# Patient Record
Sex: FEMALE | Race: WHITE | Employment: OTHER | URBAN - NONMETROPOLITAN AREA
[De-identification: names, ages, dates, MRNs, and addresses within clinical notes are randomized per-mention and may not be internally consistent; named-entity substitution may affect disease eponyms.]

---

## 2017-04-22 ENCOUNTER — NURSE TRIAGE (OUTPATIENT)
Dept: ADMINISTRATIVE | Age: 82
End: 2017-04-22

## 2018-07-02 ENCOUNTER — OFFICE VISIT (OUTPATIENT)
Dept: ENT CLINIC | Age: 83
End: 2018-07-02
Payer: MEDICARE

## 2018-07-02 VITALS
RESPIRATION RATE: 20 BRPM | TEMPERATURE: 98.2 F | SYSTOLIC BLOOD PRESSURE: 120 MMHG | DIASTOLIC BLOOD PRESSURE: 80 MMHG | WEIGHT: 169.3 LBS | HEART RATE: 84 BPM | BODY MASS INDEX: 28.21 KG/M2 | HEIGHT: 65 IN

## 2018-07-02 DIAGNOSIS — H61.891 IRRITATION OF EXTERNAL AUDITORY CANAL, RIGHT: Primary | ICD-10-CM

## 2018-07-02 DIAGNOSIS — H91.91 DECREASED HEARING, RIGHT: ICD-10-CM

## 2018-07-02 DIAGNOSIS — Z97.4 WEARS HEARING AID: ICD-10-CM

## 2018-07-02 PROCEDURE — G8427 DOCREV CUR MEDS BY ELIG CLIN: HCPCS | Performed by: NURSE PRACTITIONER

## 2018-07-02 PROCEDURE — 99203 OFFICE O/P NEW LOW 30 MIN: CPT | Performed by: NURSE PRACTITIONER

## 2018-07-02 PROCEDURE — 1090F PRES/ABSN URINE INCON ASSESS: CPT | Performed by: NURSE PRACTITIONER

## 2018-07-02 PROCEDURE — 4040F PNEUMOC VAC/ADMIN/RCVD: CPT | Performed by: NURSE PRACTITIONER

## 2018-07-02 PROCEDURE — 1123F ACP DISCUSS/DSCN MKR DOCD: CPT | Performed by: NURSE PRACTITIONER

## 2018-07-02 PROCEDURE — 1036F TOBACCO NON-USER: CPT | Performed by: NURSE PRACTITIONER

## 2018-07-02 PROCEDURE — G8419 CALC BMI OUT NRM PARAM NOF/U: HCPCS | Performed by: NURSE PRACTITIONER

## 2018-07-02 RX ORDER — OFLOXACIN 3 MG/ML
SOLUTION/ DROPS OPHTHALMIC
Qty: 1 BOTTLE | Refills: 0 | Status: SHIPPED | OUTPATIENT
Start: 2018-07-02

## 2018-07-02 ASSESSMENT — ENCOUNTER SYMPTOMS
SHORTNESS OF BREATH: 0
VOMITING: 0
WHEEZING: 0
ANAL BLEEDING: 0
EYE DISCHARGE: 0
NAUSEA: 0
APNEA: 0
CONSTIPATION: 0
BACK PAIN: 0
RHINORRHEA: 0
SORE THROAT: 0
COLOR CHANGE: 0
TROUBLE SWALLOWING: 0
STRIDOR: 0
COUGH: 0
SINUS PRESSURE: 0
DIARRHEA: 0
CHEST TIGHTNESS: 0
ABDOMINAL DISTENTION: 0
EYE ITCHING: 0
FACIAL SWELLING: 0
VOICE CHANGE: 0
BLOOD IN STOOL: 0
CHOKING: 0
EYE REDNESS: 0
ABDOMINAL PAIN: 0
RECTAL PAIN: 0
EYE PAIN: 0
PHOTOPHOBIA: 0

## 2018-07-02 NOTE — PROGRESS NOTES
[facility]  [address] [facility]  [address] [address]  Dept: [phone]  Dept Fax: [phone]  Loc: [phone]    Dasha Calvo is a 80 y.o. female who was referred by Flavia Valdes PA-C for:  Chief Complaint   Patient presents with    Other     right ear discomfort, cerumen impaction and hearing loss. Padma Derikelliot HPI:       Patient here for right cerumen impaction, ear discomfort and decreased hearing. This has been going on for over a month. The ear has been flushed and she has used drops at home without success. She went to her hearing aid dealer who recommend she be seen by ENT to have cerumen removed. She does wear hearing aids. I do not have a hearing test available. No ear drainage. No nasal symptoms. Subjective:        Review of Systems   Constitutional: Negative for activity change, appetite change, chills, diaphoresis, fatigue, fever and unexpected weight change. HENT: Negative for congestion, dental problem, drooling, ear discharge, ear pain, facial swelling, hearing loss, mouth sores, nosebleeds, postnasal drip, rhinorrhea, sinus pressure, sneezing, sore throat, tinnitus, trouble swallowing and voice change. Eyes: Negative for photophobia, pain, discharge, redness, itching and visual disturbance. Respiratory: Negative for apnea, cough, choking, chest tightness, shortness of breath, wheezing and stridor. Cardiovascular: Negative for chest pain, palpitations and leg swelling. Gastrointestinal: Negative for abdominal distention, abdominal pain, anal bleeding, blood in stool, constipation, diarrhea, nausea, rectal pain and vomiting. Endocrine: Negative for cold intolerance, heat intolerance, polydipsia, polyphagia and polyuria.    Genitourinary: Negative for decreased urine volume, difficulty urinating, dyspareunia, dysuria, enuresis, flank pain, frequency, genital sores, hematuria, menstrual problem, pelvic pain, urgency,

## 2020-07-16 ENCOUNTER — APPOINTMENT (OUTPATIENT)
Dept: GENERAL RADIOLOGY | Age: 85
End: 2020-07-16
Payer: MEDICARE

## 2020-07-16 ENCOUNTER — HOSPITAL ENCOUNTER (EMERGENCY)
Age: 85
Discharge: HOME | End: 2020-07-16
Payer: MEDICARE

## 2020-07-16 ENCOUNTER — APPOINTMENT (OUTPATIENT)
Dept: CT IMAGING | Age: 85
End: 2020-07-16
Payer: MEDICARE

## 2020-07-16 ENCOUNTER — APPOINTMENT (OUTPATIENT)
Dept: INTERVENTIONAL RADIOLOGY/VASCULAR | Age: 85
End: 2020-07-16
Payer: MEDICARE

## 2020-07-16 VITALS
DIASTOLIC BLOOD PRESSURE: 61 MMHG | TEMPERATURE: 98 F | HEIGHT: 60 IN | HEART RATE: 60 BPM | OXYGEN SATURATION: 95 % | BODY MASS INDEX: 28.47 KG/M2 | SYSTOLIC BLOOD PRESSURE: 105 MMHG | RESPIRATION RATE: 18 BRPM | WEIGHT: 145 LBS

## 2020-07-16 LAB
ANION GAP SERPL CALCULATED.3IONS-SCNC: 17 MEQ/L (ref 8–16)
BASOPHILS # BLD: 0.4 %
BASOPHILS ABSOLUTE: 0 THOU/MM3 (ref 0–0.1)
BUN BLDV-MCNC: 57 MG/DL (ref 7–22)
CALCIUM SERPL-MCNC: 9.6 MG/DL (ref 8.5–10.5)
CHLORIDE BLD-SCNC: 91 MEQ/L (ref 98–111)
CO2: 32 MEQ/L (ref 23–33)
CREAT SERPL-MCNC: 1.3 MG/DL (ref 0.4–1.2)
EOSINOPHIL # BLD: 1.9 %
EOSINOPHILS ABSOLUTE: 0.2 THOU/MM3 (ref 0–0.4)
ERYTHROCYTE [DISTWIDTH] IN BLOOD BY AUTOMATED COUNT: 13.2 % (ref 11.5–14.5)
ERYTHROCYTE [DISTWIDTH] IN BLOOD BY AUTOMATED COUNT: 48.1 FL (ref 35–45)
GFR SERPL CREATININE-BSD FRML MDRD: 38 ML/MIN/1.73M2
GLUCOSE BLD-MCNC: 113 MG/DL (ref 70–108)
HCT VFR BLD CALC: 44.4 % (ref 37–47)
HEMOGLOBIN: 14.2 GM/DL (ref 12–16)
IMMATURE GRANS (ABS): 0.02 THOU/MM3 (ref 0–0.07)
IMMATURE GRANULOCYTES: 0.2 %
LYMPHOCYTES # BLD: 26 %
LYMPHOCYTES ABSOLUTE: 2.5 THOU/MM3 (ref 1–4.8)
MAGNESIUM: 1.9 MG/DL (ref 1.6–2.4)
MCH RBC QN AUTO: 32.1 PG (ref 26–33)
MCHC RBC AUTO-ENTMCNC: 32 GM/DL (ref 32.2–35.5)
MCV RBC AUTO: 100.5 FL (ref 81–99)
MONOCYTES # BLD: 9.3 %
MONOCYTES ABSOLUTE: 0.9 THOU/MM3 (ref 0.4–1.3)
NUCLEATED RED BLOOD CELLS: 0 /100 WBC
OSMOLALITY CALCULATION: 296 MOSMOL/KG (ref 275–300)
PLATELET # BLD: 181 THOU/MM3 (ref 130–400)
PMV BLD AUTO: 11 FL (ref 9.4–12.4)
POTASSIUM REFLEX MAGNESIUM: 3.1 MEQ/L (ref 3.5–5.2)
RBC # BLD: 4.42 MILL/MM3 (ref 4.2–5.4)
SEG NEUTROPHILS: 62.2 %
SEGMENTED NEUTROPHILS ABSOLUTE COUNT: 6 THOU/MM3 (ref 1.8–7.7)
SODIUM BLD-SCNC: 140 MEQ/L (ref 135–145)
WBC # BLD: 9.7 THOU/MM3 (ref 4.8–10.8)

## 2020-07-16 PROCEDURE — 80048 BASIC METABOLIC PNL TOTAL CA: CPT

## 2020-07-16 PROCEDURE — 36415 COLL VENOUS BLD VENIPUNCTURE: CPT

## 2020-07-16 PROCEDURE — 2580000003 HC RX 258: Performed by: PHYSICIAN ASSISTANT

## 2020-07-16 PROCEDURE — 73060 X-RAY EXAM OF HUMERUS: CPT

## 2020-07-16 PROCEDURE — 99283 EMERGENCY DEPT VISIT LOW MDM: CPT

## 2020-07-16 PROCEDURE — 72125 CT NECK SPINE W/O DYE: CPT

## 2020-07-16 PROCEDURE — 73590 X-RAY EXAM OF LOWER LEG: CPT

## 2020-07-16 PROCEDURE — 70450 CT HEAD/BRAIN W/O DYE: CPT

## 2020-07-16 PROCEDURE — 85025 COMPLETE CBC W/AUTO DIFF WBC: CPT

## 2020-07-16 PROCEDURE — 83735 ASSAY OF MAGNESIUM: CPT

## 2020-07-16 PROCEDURE — 93971 EXTREMITY STUDY: CPT

## 2020-07-16 PROCEDURE — 6370000000 HC RX 637 (ALT 250 FOR IP): Performed by: PHYSICIAN ASSISTANT

## 2020-07-16 RX ORDER — POTASSIUM CHLORIDE 750 MG/1
40 TABLET, FILM COATED, EXTENDED RELEASE ORAL ONCE
Status: COMPLETED | OUTPATIENT
Start: 2020-07-16 | End: 2020-07-16

## 2020-07-16 RX ORDER — 0.9 % SODIUM CHLORIDE 0.9 %
1000 INTRAVENOUS SOLUTION INTRAVENOUS ONCE
Status: COMPLETED | OUTPATIENT
Start: 2020-07-16 | End: 2020-07-16

## 2020-07-16 RX ADMIN — SODIUM CHLORIDE 1000 ML: 9 INJECTION, SOLUTION INTRAVENOUS at 16:12

## 2020-07-16 RX ADMIN — POTASSIUM CHLORIDE 40 MEQ: 750 TABLET, FILM COATED, EXTENDED RELEASE ORAL at 16:12

## 2020-07-16 ASSESSMENT — ENCOUNTER SYMPTOMS
FACIAL SWELLING: 1
ABDOMINAL DISTENTION: 0
VOMITING: 0
PHOTOPHOBIA: 0
SHORTNESS OF BREATH: 0
CHEST TIGHTNESS: 0
ABDOMINAL PAIN: 0

## 2020-07-16 NOTE — ED PROVIDER NOTES
pain and vomiting. Musculoskeletal: Negative for neck pain and neck stiffness. Neurological: Positive for headaches. Negative for dizziness, speech difficulty and light-headedness. Hematological: Bruises/bleeds easily. All pertinent systems were reviewed and are negative unless indicated in the HPI. PAST MEDICAL HISTORY    has a past medical history of Abnormal echocardiogram, Anesthesia, Arthritis, CHF (congestive heart failure) ([facility] [address] COPD (chronic obstructive pulmonary disease) ([facility] [address] Exudative pleural effusion, GERD (gastroesophageal reflux disease), History of humerus fracture, Hyperlipidemia, Hypertension, Snores, and Stomach ulcer. SURGICAL HISTORY      has a past surgical history that includes Axillary Surgery (1/06/2008); Humerus fracture surgery (Left, 1/05/2008); Cholecystectomy (1990s); Wrist surgery (Left, 1980s); Aortic valve replacement (8-6-2014); fracture surgery; Cardiac surgery; back surgery; Colonoscopy; Endoscopy, colon, diagnostic; eye surgery; joint replacement; and vascular surgery. CURRENT MEDICATIONS       Discharge Medication List as of 7/16/2020  5:40 PM      CONTINUE these medications which have NOT CHANGED    Details   ofloxacin (OCUFLOX) 0.3 % solution 4 drops right ear twice daily for 5 days. , Disp-1 Bottle, R-0Normal      bumetanide (BUMEX) 0.5 MG tablet Take 1 tablet by mouth 2 times daily Any wt gain increase > 5 lbs, give extra bumex 0.5 mg PO x 1, Disp-60 tablet, R-3Normal      metoprolol (TOPROL-XL) 25 MG XL tablet Take 1 tablet by mouth 2 times daily, Disp-30 tablet, R-3      spironolactone (ALDACTONE) 25 MG tablet Take 1 tablet by mouth daily, Disp-30 tablet, R-3      potassium chloride SA (K-DUR;KLOR-CON M) 10 MEQ tablet Take 1 tablet by mouth daily, Disp-60 tablet, R-3      aspirin 325 MG EC tablet Take 325 mg by mouth daily      Calcium Carb-Cholecalciferol (CALCIUM + D3) 600-200 MG-UNIT TABS Take 2 tablets by mouth daily      escitalopram (LEXAPRO) 10 MG tablet Take 10 mg by mouth daily      gabapentin (NEURONTIN) 100 MG capsule Take 100 mg by mouth 3 times daily      albuterol sulfate  (90 BASE) MCG/ACT inhaler Inhale 2 puffs into the lungs every 6 hours as needed for Wheezing      sucralfate (CARAFATE) 1 GM tablet Take 1 g by mouth 4 times daily (before meals and nightly)       pantoprazole (PROTONIX) 40 MG tablet Take 40 mg by mouth 2 times daily. Multiple Vitamin (MULTI-VITAMIN DAILY PO) Take 1 tablet by mouth daily       docusate sodium (COLACE) 100 MG capsule Take 100 mg by mouth 2 times daily. ALLERGIES     is allergic to other. FAMILY HISTORY     She indicated that her mother is . She indicated that her father is . family history includes Cancer in her brother, brother, and father; High Blood Pressure in her mother; Stroke in her mother. SOCIAL HISTORY      reports that she has never smoked. She has never used smokeless tobacco. She reports that she does not drink alcohol or use drugs. PHYSICAL EXAM     INITIAL VITALS:  height is 5' (1.524 m) and weight is 145 lb (65.8 kg). Her oral temperature is 98 °F (36.7 °C). Her blood pressure is 105/61 and her pulse is 60. Her respiration is 18 and oxygen saturation is 95%. Physical Exam  Constitutional:       General: She is not in acute distress. Appearance: Normal appearance. HENT:      Head: Normocephalic. No abrasion or laceration. Nose: Nose normal.   Eyes:      Extraocular Movements: Extraocular movements intact. Pupils: Pupils are equal, round, and reactive to light. Neck:      Musculoskeletal: Normal range of motion. Cardiovascular:      Rate and Rhythm: Normal rate and regular rhythm. Heart sounds: Normal heart sounds. Pulmonary:      Effort: Pulmonary effort is normal.      Breath sounds: Normal breath sounds. Abdominal:      General: There is no distension. Palpations: Abdomen is soft. Tenderness:  There is no abdominal tenderness. There is no guarding. Musculoskeletal:         General: Swelling, tenderness and signs of injury present. Legs:    Skin:     Findings: Bruising and ecchymosis present. Neurological:      General: No focal deficit present. Mental Status: She is alert and oriented to person, place, and time. Cranial Nerves: No cranial nerve deficit. Motor: Weakness present. Comments: Patient has chronic weakness in the LLE and LUE from previous injury   Psychiatric:         Mood and Affect: Mood normal.         Behavior: Behavior normal.           DIFFERENTIAL DIAGNOSIS:   Includes but is not limited to:   1. DVT  2. Fracture  3. Ecchymosis     DIAGNOSTIC RESULTS     EKG: All EKG's are interpreted by the Emergency Department Physician who either signs or Co-signs this chart in the absence of a cardiologist.  None    RADIOLOGY: non-plain film images(s) such as CT, Ultrasound and MRI are read by the radiologist.  Plain radiographic images are visualized and preliminarily interpreted by the emergency physician unless otherwise stated below. CT Head WO Contrast   Final Result    No evidence of acute intracranial abnormality. **This report has been created using voice recognition software. It may contain minor errors which are inherent in voice recognition technology. **      Final report electronically signed by Dr. Chucky Clancy MD on 7/16/2020 3:06 PM      CT Cervical Spine WO Contrast   Final Result   No acute fracture or subluxation               **This report has been created using voice recognition software. It may contain minor errors which are inherent in voice recognition technology. **      Final report electronically signed by Dr. Sujit Mott on 7/16/2020 3:03 PM      VL DUP LOWER EXTREMITY VENOUS LEFT   Final Result   No evidence of left lower extremity DVT. **This report has been created using voice recognition software.  It may contain minor errors which are inherent in voice recognition technology. **      Final report electronically signed by Dr. Ilir Giron on 7/16/2020 2:45 PM      XR TIBIA FIBULA LEFT (2 VIEWS)   Final Result    No acute fracture or dislocation. **This report has been created using voice recognition software. It may contain minor errors which are inherent in voice recognition technology. **      Final report electronically signed by Dr. Ilir Giron on 7/16/2020 1:44 PM      XR HUMERUS LEFT (MIN 2 VIEWS)   Final Result   No acute fracture or dislocation. **This report has been created using voice recognition software. It may contain minor errors which are inherent in voice recognition technology. **      Final report electronically signed by Dr. Ilir Giron on 7/16/2020 1:50 PM            LABS:   Labs Reviewed   CBC WITH AUTO DIFFERENTIAL - Abnormal; Notable for the following components:       Result Value    .5 (*)     MCHC 32.0 (*)     RDW-SD 48.1 (*)     All other components within normal limits   BASIC METABOLIC PANEL W/ REFLEX TO MG FOR LOW K - Abnormal; Notable for the following components:    Potassium reflex Magnesium 3.1 (*)     Chloride 91 (*)     Glucose 113 (*)     BUN 57 (*)     CREATININE 1.3 (*)     All other components within normal limits   ANION GAP - Abnormal; Notable for the following components:    Anion Gap 17.0 (*)     All other components within normal limits   GLOMERULAR FILTRATION RATE, ESTIMATED - Abnormal; Notable for the following components:    Est, Glom Filt Rate 38 (*)     All other components within normal limits   MAGNESIUM   OSMOLALITY         EMERGENCYDEPARTMENT COURSE AND MEDICAL DECISION MAKING:   Vitals:    Vitals:    07/16/20 1245 07/16/20 1333 07/16/20 1503 07/16/20 1617   BP: (!) 148/66 132/65 (!) 109/45 105/61   Pulse: 60 60 60 60   Resp: 17 19 19 18   Temp: 98 °F (36.7 °C)      TempSrc: Oral      SpO2: 95% 95% 94% 95%   Weight: 145 lb (65.8 kg)      Height: 5' (1.524 m)            Pertinent Labs & Imaging studies reviewed. (See chart for details)           Controlled Substances Monitoring:     No flowsheet data found. Mechanical fall just around a week ago resulting in ongoing pain to the left lower leg, left upper arm from previous surgery, and contusion to the face. She reports that she has been doing well at home. Imaging including left tib-fib, left humerus, CT of the head, CT of the cervical spine, and duplex ultrasound were obtained given the swelling to the left lower extremity. Imaging is reassuring. Screening laboratory studies were obtained which revealed a creatinine of 1.3, which is up from her baseline of 0.7. I did discuss this case with Dr. Valerie Dan at this point. She was given a liter of normal saline of the bolus along with 40 mg of oral potassium given her potassium being slightly low. He stated that the patient could be hydrated with p.o. fluids at home and have her blood work rechecked in the outpatient setting. I did offer admission to the patient and her daughter however she has declined. She will have her BMP rechecked by the primary care doctor in 24 to 48 hours. The patient states that she would like to go home and be given the chance to p.o. hydrate. She has been able to void without difficulty. She declined admission and stated that she would like to go home. Her daughter is also very supportive of this plan. She was advised return the emergency room for decreased urinary output, fall or injury, or other worsening symptoms or concerns that she feels warrants urgent provider evaluation. Strict return precautions and follow up instructions were discussed with the patient with which the patient agrees     Physical assessment findings, diagnostic testing(s) if applicable, and vital signs reviewed with patient/patient representative. Questions answered.    Medications as directed, including OTC medications for supportive care. Education provided on medications. Differential diagnosis(s) discussed with patient/patient representative. Home care/self care instructions reviewed with patient/patient representative. Patient is to follow-up with family care provider in 2-3 days if no improvement. Patient is to go to the emergency department if symptoms worsen. Patient/patient representative is aware of care plan, questions answered, verbalizes understanding and is in agreement. ED Medications administered this visit:   Medications   0.9 % sodium chloride bolus (0 mLs Intravenous Stopped 7/16/20 1812)   potassium chloride (KLOR-CON) extended release tablet 40 mEq (40 mEq Oral Given 7/16/20 1612)           I have given the patient strict written and verbal instructions about care at home, follow-up, and signs and symptoms of worsening of condition and they did verbalize understanding. Patient was seen in consultation with Dr. Casimiro Schlatter, MD.  He is in agreement with my assessment and plan of care for this patient. CRITICAL CARE:   None    CONSULTS:  None    PROCEDURES:  None    FINAL IMPRESSION      1. GOSIA (acute kidney injury) ([facility] [address]    2. Left leg pain    3. Left arm pain    4.  Fall, initial encounter          DISPOSITION/PLAN   DISPOSITION Decision To Discharge 07/16/2020 05:38:12 PM         PATIENT REFERRED TO:  Trisha Mooney PA-C  [address] [place] [id number] [phone]    Call in 1 day  To schedule urgent follow-up to have your kidney function rechecked in the next 24 to 48 hours      DISCHARGE MEDICATIONS:  Discharge Medication List as of 7/16/2020  5:40 PM          (Please note that portions of this note were completed with a voice recognition program.  Efforts were made to edit the dictations but occasionally words are mis-transcribed.)    Gokul Thurston, [facility], [address]  07/16/20 6359

## 2020-07-16 NOTE — ED NOTES
Pt resting in bed, respirations unlabored. Medicated per MAR. Call light in reach.      Marimar Kwok RN  07/16/20 0374

## 2020-07-16 NOTE — ED NOTES
Pt had episode of incontinence. Pt cleaned up and new depends in place at this time. Transport here to take pt to vascular at this time.      Gustavo Leroy RN  07/16/20 5581

## 2020-07-16 NOTE — ED PROVIDER NOTES
[address] include 234 beds at the [facility]  EMERGENCY MEDICINE ATTENDING ATTESTATION      Evaluation of [place] Corin. Case discussed and care plan developed with physician assistant. I agree with the note and plan as documented by her, except if my documentation differs. Patient case discussed with me after patient care had been completed. Please see the physician assistant completed note for final disposition except as documented on this attestation. Diagnosis, treatment and disposition plans were discussed and agreed upon by patient. This transcription was electronically signed. It was dictated by use of voice recognition software and electronically transcribed. The transcription may contain errors not detected in proofreading.        Electronically signed by Myriam Clark MD on 7/16/20 at 4:03 PM EDT       Myriam Clark MD  07/16/20 0888

## 2020-11-03 PROBLEM — I10 HYPERTENSION: Status: RESOLVED | Noted: 2020-11-03 | Resolved: 2020-11-03

## 2021-03-09 NOTE — ED NOTES
Presents to ER via private with daughter with complaints of left leg pain. Daughter states pt fell last week, but was not seen in ER after falling. States since the fall pt has been complaining of left leg pain. Pt states she has noticed \"lumps\" on her left lower leg and decided today she wants her leg checked out. Pt [place] but alert and oriented, respirations unlabored. Pt denies any pain while at rest. Pt lives at home alone but has children check on her daily. Will continue to monitor.      Marina Maki RN  07/16/20 6778 well developed, well nourished , in no acute distress , ambulating without difficulty , normal communication ability